# Patient Record
(demographics unavailable — no encounter records)

---

## 2025-04-02 NOTE — ASSESSMENT
[FreeTextEntry1] : #Contact dermatitis Avoid nickel containing metals. It is important to continue sensitive skin care between flares and use medications as needed. Eczema can lead to temporary light or dark spots that improve when the eczema resolves. All caretakers should avoid perfumes and use free and clear laundry detergents. Avoid use of air fresheners. -Start triamcinolone 0.1% ointment once daily to rough red scaly areas  Use for 2 weeks on, 1 week off, repeat as needed. SED including atrophy, dyspigmentation, telangiectasias, striae. Proper use reviewed including only using to affected area and avoidance of prolonged use. Avoid direct contact with eyes.  #Rash Unclear etiology - post inflammatory erythema from intertrigo, less likely CARP given color and flat texture -Start ketoconazole cream daily to AA until resolved  FU EDWINA Farmer MD Pediatric Dermatology [facility]  ------------------------------------------------ [employer]: Patient Handouts [email or web address]

## 2025-04-02 NOTE — PHYSICAL EXAM
[FreeTextEntry3] : ill defined erythematous scaly thin plaque right wrist faint erythema around neck reticulated erythematous patches inframammary fold and mid chest

## 2025-04-02 NOTE — HISTORY OF PRESENT ILLNESS
[FreeTextEntry1] : rash [de-identified] : JYOTHI SR is a 17 year girl here with mom for new pt visit re: #rashes on wrist/neck that have been coming/going for several months can be very itchy she does wear necklaces and bracelets  #rash under breasts  not symptomatic, improving she has hx of oral thrush after taking oral antiobiotics